# Patient Record
Sex: MALE | Race: OTHER
[De-identification: names, ages, dates, MRNs, and addresses within clinical notes are randomized per-mention and may not be internally consistent; named-entity substitution may affect disease eponyms.]

---

## 2019-03-09 ENCOUNTER — HOSPITAL ENCOUNTER (EMERGENCY)
Dept: HOSPITAL 54 - ER | Age: 34
Discharge: HOME | End: 2019-03-09
Payer: SELF-PAY

## 2019-03-09 VITALS — HEIGHT: 73 IN | BODY MASS INDEX: 24.52 KG/M2 | WEIGHT: 185 LBS

## 2019-03-09 VITALS — DIASTOLIC BLOOD PRESSURE: 98 MMHG | SYSTOLIC BLOOD PRESSURE: 132 MMHG

## 2019-03-09 DIAGNOSIS — Z59.0: ICD-10-CM

## 2019-03-09 DIAGNOSIS — R00.0: ICD-10-CM

## 2019-03-09 DIAGNOSIS — F15.10: Primary | ICD-10-CM

## 2019-03-09 DIAGNOSIS — R45.6: ICD-10-CM

## 2019-03-09 PROCEDURE — 99283 EMERGENCY DEPT VISIT LOW MDM: CPT

## 2019-03-09 PROCEDURE — 93005 ELECTROCARDIOGRAM TRACING: CPT

## 2019-03-09 PROCEDURE — 96372 THER/PROPH/DIAG INJ SC/IM: CPT

## 2019-03-09 SDOH — ECONOMIC STABILITY - HOUSING INSECURITY: HOMELESSNESS: Z59.0

## 2019-03-09 NOTE — NUR
BIBRA60/[employer] FROM STREET FOR BIZARRE/AGGRESSIVE BEHAIVOR. PER PT HAS BEEN ON 
METH FOR LAST 2 WEEKS, HEARING VOICES, THINKS SOMEONE IS AFTER HIM. PT ANXIOUS 
& PARANOID, NO AGGRESSIVE BEHAVIOR @ THIS TIME. PT SEEN & EVAL'D BY DR. ABDULLAHI & 
WILL CONT TO MONITOR.

## 2020-03-08 ENCOUNTER — HOSPITAL ENCOUNTER (EMERGENCY)
Dept: HOSPITAL 54 - ER | Age: 35
Discharge: HOME | End: 2020-03-08
Payer: COMMERCIAL

## 2020-03-08 VITALS — WEIGHT: 215 LBS | BODY MASS INDEX: 26.73 KG/M2 | HEIGHT: 75 IN

## 2020-03-08 VITALS — SYSTOLIC BLOOD PRESSURE: 132 MMHG | DIASTOLIC BLOOD PRESSURE: 77 MMHG

## 2020-03-08 DIAGNOSIS — F11.23: Primary | ICD-10-CM

## 2020-03-08 DIAGNOSIS — R11.2: ICD-10-CM

## 2020-03-08 DIAGNOSIS — Z59.0: ICD-10-CM

## 2020-03-08 PROCEDURE — 96372 THER/PROPH/DIAG INJ SC/IM: CPT

## 2020-03-08 PROCEDURE — 99283 EMERGENCY DEPT VISIT LOW MDM: CPT

## 2020-03-08 SDOH — ECONOMIC STABILITY - HOUSING INSECURITY: HOMELESSNESS: Z59.0
